# Patient Record
(demographics unavailable — no encounter records)

---

## 2024-11-05 NOTE — ASSESSMENT
[FreeTextEntry1] : Impression: Internal derangement left knee mild sprain lateral collateral ligament.  This patient will be treated with restricted activities along with Aleve 2 tablets twice daily PC.  I would anticipate this to resolve in 2-3 weeks time.  If not he will return.

## 2024-11-05 NOTE — HISTORY OF PRESENT ILLNESS
[de-identified] : This 43-year-old is seen for evaluation of the left knee.  He was doing well up until this past weekend when he [place] [employer] when he noted discomfort along the lateral aspect of the left knee.  He is felt a possible popping sensation he did not have the sensation of the knee moving out of place.  No locking buckling or obvious sensation of instability.  He has not had any significant swelling or radiation of his pain distally.  He points to the lateral aspect of his knee.  He states in the past he did have a meniscal tear that was treated conservatively with an excellent result.

## 2024-11-05 NOTE — PHYSICAL EXAM
[de-identified] : Exam today reveals mild left-sided limp he has good motion to the left hip and knee the knee has a small effusion only the knee is stable to stress of the cruciate and collateral ligaments.  He has no tenderness medially he has mild tenderness along the iliotibial band distally onto its insertion point as well as over the lateral collateral ligament.  The ligament again is intact to stress.  No significant joint line tenderness is noted.  Popliteal fossa calf and neuro vas exam are negative.  X-rays ordered and taken today AP lateral notch views of the left knee reveal minimal degenerative change in the lateral compartment no loose body/lesion